# Patient Record
Sex: FEMALE | Race: WHITE | NOT HISPANIC OR LATINO | Employment: OTHER | URBAN - NONMETROPOLITAN AREA
[De-identification: names, ages, dates, MRNs, and addresses within clinical notes are randomized per-mention and may not be internally consistent; named-entity substitution may affect disease eponyms.]

---

## 2023-09-19 ENCOUNTER — OFFICE VISIT (OUTPATIENT)
Dept: OTOLARYNGOLOGY | Facility: CLINIC | Age: 73
End: 2023-09-19
Payer: MEDICARE

## 2023-09-19 VITALS — BODY MASS INDEX: 40.75 KG/M2 | WEIGHT: 230 LBS | HEIGHT: 63 IN

## 2023-09-19 DIAGNOSIS — K11.7 SIALOSIS: ICD-10-CM

## 2023-09-19 DIAGNOSIS — R60.0 SWELLING OF RIGHT PAROTID GLAND: Primary | ICD-10-CM

## 2023-09-19 PROCEDURE — 1160F RVW MEDS BY RX/DR IN RCRD: CPT | Performed by: EMERGENCY MEDICINE

## 2023-09-19 PROCEDURE — 1159F MED LIST DOCD IN RCRD: CPT | Performed by: EMERGENCY MEDICINE

## 2023-09-19 PROCEDURE — 99204 OFFICE O/P NEW MOD 45 MIN: CPT | Performed by: EMERGENCY MEDICINE

## 2023-09-19 RX ORDER — FUROSEMIDE 40 MG/1
60 TABLET ORAL DAILY
COMMUNITY

## 2023-09-19 RX ORDER — MONTELUKAST SODIUM 10 MG/1
10 TABLET ORAL DAILY
COMMUNITY
Start: 2023-06-12

## 2023-09-19 RX ORDER — BENAZEPRIL HYDROCHLORIDE 20 MG/1
20 TABLET ORAL DAILY
COMMUNITY

## 2023-09-19 RX ORDER — ACETAMINOPHEN 500 MG
500 TABLET ORAL EVERY 6 HOURS PRN
COMMUNITY

## 2023-09-19 RX ORDER — CLOPIDOGREL BISULFATE 75 MG/1
75 TABLET ORAL DAILY
COMMUNITY
Start: 2023-05-12

## 2023-09-19 RX ORDER — FLUTICASONE FUROATE, UMECLIDINIUM BROMIDE AND VILANTEROL TRIFENATATE 200; 62.5; 25 UG/1; UG/1; UG/1
POWDER RESPIRATORY (INHALATION)
COMMUNITY
Start: 2023-05-24

## 2023-09-19 RX ORDER — POTASSIUM CHLORIDE 1.5 G/1.58G
20 POWDER, FOR SOLUTION ORAL 2 TIMES DAILY
COMMUNITY

## 2023-09-19 RX ORDER — DILTIAZEM HYDROCHLORIDE 240 MG/1
CAPSULE, COATED, EXTENDED RELEASE ORAL
COMMUNITY
Start: 2023-08-11

## 2023-09-19 RX ORDER — PILOCARPINE HYDROCHLORIDE 5 MG/1
5 TABLET, FILM COATED ORAL 3 TIMES DAILY
Qty: 90 TABLET | Refills: 3 | Status: SHIPPED | OUTPATIENT
Start: 2023-09-19

## 2023-09-19 RX ORDER — ALLOPURINOL 100 MG/1
200 TABLET ORAL DAILY
COMMUNITY
Start: 2023-03-27

## 2023-09-19 RX ORDER — GUAIFENESIN 600 MG/1
600 TABLET, EXTENDED RELEASE ORAL EVERY 12 HOURS SCHEDULED
Qty: 60 TABLET | Refills: 3 | Status: SHIPPED | OUTPATIENT
Start: 2023-09-19

## 2023-09-19 NOTE — PROGRESS NOTES
"    TIANA Gibbons ENT [facility] EAR NOSE & THROAT  [address], [address]  [facility] [address]  Fax [phone]  Phone [phone]      Visit Type: NEW PATIENT   Chief Complaint   Patient presents with    parotid swelling     \"Lasted over a month\"    Mass        HPI  She complains of parotid swelling. The symptoms are localized to the right side. The patient has had severe symptoms. The symptoms have been  occurred one time  for the last several months. The patient reports she had taken a bite of pizza and immediately the right side of her face swelled. The patient has been treated with two rounds of  Clindamycin in the past with no change.    She reports she drinks about 64 ounces of water daily, she denies diabetes.  She has SYLVIE on CPAP. She takes lasix 60 mg daily. She has dry mouth and dry eyes.  There is are no reports of family history of autoimmune disorders.     Past Medical History:   Diagnosis Date    Emphysema, unspecified     Hypertension     Sleep apnea     on bi pap       Past Surgical History:   Procedure Laterality Date    ANTERIOR CERVICAL DISCECTOMY W/ FUSION  01/06/2011    c6-7    BACK SURGERY  1984    L5    CATARACT EXTRACTION, BILATERAL  2020    CERVICAL DISCECTOMY POSTERIOR FUSION WITH INSTRUMENTATION  04/06/2013    C2-C6    CTA HEAD NECK STROKE PROTOCOL  2013    CYSTOSCOPY  2006    KNEE SURGERY Left 2010    TUBAL ABDOMINAL LIGATION  1988       Family History: Her family history is not on file.     Social History: She  reports that she has never smoked. She has never used smokeless tobacco. She reports that she does not drink alcohol and does not use drugs.    Home Medications:  Fluticasone-Umeclidin-Vilant, acetaminophen, allopurinol, benazepril, clopidogrel, dilTIAZem CD, furosemide, guaiFENesin, montelukast, pilocarpine, and potassium chloride    Allergies:  She is allergic to penicillins.       Vital Signs:      ENT " Physical Exam  Constitutional  Appearance: patient appears well-developed, well-nourished and well-groomed,  Communication/Voice: communication appropriate for developmental age; vocal quality normal;  Constitutional comments: Morbid obesity  Head and Face  Appearance: head appears normal, face appears normal and face appears atraumatic;  Palpation: facial palpation normal;  Salivary: glands normal; no salivary tenderness or mass noted;  Ear  Hearing: intact;  Auricles: right auricle normal; left auricle normal;  External Mastoids: right external mastoid normal; left external mastoid normal;  Nose  External Nose: nares patent bilaterally;  Oral Cavity/Oropharynx  Tongue: tongue macroglossia present; tongue is dry;  Oral mucosa: mucous membranes dry;  Neck  Neck: neck normal; neck palpation normal;  Respiratory  Inspection: breathing unlabored;  Cardiovascular  Inspection: extremities are warm and well perfused;  Lymphatic  Palpation: lymph nodes normal;       Result Review    RESULTS REVIEW    I have reviewed the patients old records in the chart.   The following results/records were reviewed:   US Head Neck Soft Tissue (09/19/2023 09:01)     Assessment & Plan    Diagnoses and all orders for this visit:    1. Swelling of right parotid gland (Primary)  -     US Head Neck Soft Tissue; Future    2. Sialosis    Other orders  -     guaiFENesin (MUCINEX) 600 MG 12 hr tablet; Take 1 tablet by mouth Every 12 (Twelve) Hours.  Dispense: 60 tablet; Refill: 3  -     pilocarpine (SALAGEN) 5 MG tablet; Take 1 tablet by mouth 3 (Three) Times a Day. Take twice daily x 3 days then if tolerating increase to three times daily  Dispense: 90 tablet; Refill: 3       Increase water/ non caffeinated drink intake to at least 6-8 glasses a day. Decrease caffeine intake. Plain Mucinex over the counter as needed to thin out secretions.    Return in about 3 months (around 12/19/2023) for Follow up with TIANA He.      Electronically  signed by Rachel Dwyer, APRN 09/19/23 9:19 AM CDT.

## 2023-12-19 ENCOUNTER — OFFICE VISIT (OUTPATIENT)
Dept: OTOLARYNGOLOGY | Facility: CLINIC | Age: 73
End: 2023-12-19
Payer: MEDICARE

## 2023-12-19 VITALS
TEMPERATURE: 97.7 F | WEIGHT: 231.2 LBS | SYSTOLIC BLOOD PRESSURE: 147 MMHG | HEART RATE: 74 BPM | DIASTOLIC BLOOD PRESSURE: 79 MMHG | BODY MASS INDEX: 40.96 KG/M2 | HEIGHT: 63 IN

## 2023-12-19 DIAGNOSIS — K11.7 SIALOSIS: Primary | ICD-10-CM

## 2023-12-19 RX ORDER — LIFITEGRAST 50 MG/ML
1 SOLUTION/ DROPS OPHTHALMIC 2 TIMES DAILY
COMMUNITY

## 2023-12-19 NOTE — PROGRESS NOTES
TIANA Gibbons ENT [facility] EAR NOSE & THROAT  [address], [address]  [facility] [address]  Fax [phone]  Phone [phone]      Visit Type: FOLLOW UP   Chief Complaint   Patient presents with    Follow-up        HPI  She presents for a follow up evaluation. She has not had any further episodes of acute swelling.  She has stopped taking the pilocarpine due to leg swelling, she does continue taking mucinex.     Past Medical History:   Diagnosis Date    Emphysema, unspecified     Hypertension     Sleep apnea     on bi pap       Past Surgical History:   Procedure Laterality Date    ANTERIOR CERVICAL DISCECTOMY W/ FUSION  01/06/2011    c6-7    BACK SURGERY  1984    L5    CATARACT EXTRACTION, BILATERAL  2020    CERVICAL DISCECTOMY POSTERIOR FUSION WITH INSTRUMENTATION  04/06/2013    C2-C6    CTA HEAD NECK STROKE PROTOCOL  2013    CYSTOSCOPY  2006    KNEE SURGERY Left 2010    TUBAL ABDOMINAL LIGATION  1988       Family History: Her family history is not on file.     Social History: She  reports that she has never smoked. She has never used smokeless tobacco. She reports that she does not drink alcohol and does not use drugs.    Home Medications:  Fluticasone-Umeclidin-Vilant, Lifitegrast, acetaminophen, allopurinol, benazepril, clopidogrel, dilTIAZem CD, furosemide, guaiFENesin, montelukast, pilocarpine, and potassium chloride    Allergies:  She is allergic to penicillins.       Vital Signs:   Temp:  [97.7 °F (36.5 °C)] 97.7 °F (36.5 °C)  Heart Rate:  [74] 74  BP: (147)/(79) 147/79  ENT Physical Exam  Head and Face  Appearance: head appears normal, face appears normal and face appears atraumatic;  Palpation: facial palpation normal;  Salivary: glands normal;           Result Review    RESULTS REVIEW    I have reviewed the patients old records in the chart.     Assessment & Plan  Sialosis              Continue current management plan.  Conservative  management.  Return if symptoms worsen or fail to improve.    Electronically signed by TIANA Gibbons 12/19/23 1:07 PM CST.